# Patient Record
Sex: FEMALE | Race: WHITE
[De-identification: names, ages, dates, MRNs, and addresses within clinical notes are randomized per-mention and may not be internally consistent; named-entity substitution may affect disease eponyms.]

---

## 2019-01-27 ENCOUNTER — HOSPITAL ENCOUNTER (EMERGENCY)
Dept: HOSPITAL 74 - JER | Age: 24
Discharge: HOME | End: 2019-01-27
Payer: COMMERCIAL

## 2019-01-27 VITALS — BODY MASS INDEX: 30.9 KG/M2

## 2019-01-27 VITALS — SYSTOLIC BLOOD PRESSURE: 126 MMHG | HEART RATE: 69 BPM | DIASTOLIC BLOOD PRESSURE: 68 MMHG

## 2019-01-27 VITALS — TEMPERATURE: 98.5 F

## 2019-01-27 DIAGNOSIS — G43.109: Primary | ICD-10-CM

## 2019-01-27 LAB
ALBUMIN SERPL-MCNC: 3.7 G/DL (ref 3.4–5)
ALP SERPL-CCNC: 73 U/L (ref 45–117)
ALT SERPL-CCNC: 16 U/L (ref 13–61)
ANION GAP SERPL CALC-SCNC: 5 MMOL/L (ref 8–16)
APPEARANCE UR: (no result)
AST SERPL-CCNC: 13 U/L (ref 15–37)
BASOPHILS # BLD: 1.2 % (ref 0–2)
BILIRUB SERPL-MCNC: 0.2 MG/DL (ref 0.2–1)
BILIRUB UR STRIP.AUTO-MCNC: NEGATIVE MG/DL
BUN SERPL-MCNC: 14 MG/DL (ref 7–18)
CALCIUM SERPL-MCNC: 8.3 MG/DL (ref 8.5–10.1)
CHLORIDE SERPL-SCNC: 107 MMOL/L (ref 98–107)
CO2 SERPL-SCNC: 28 MMOL/L (ref 21–32)
COLOR UR: YELLOW
CREAT SERPL-MCNC: 0.6 MG/DL (ref 0.55–1.3)
DEPRECATED RDW RBC AUTO: 13.6 % (ref 11.6–15.6)
EOSINOPHIL # BLD: 6.8 % (ref 0–4.5)
EPITH CASTS URNS QL MICRO: (no result) /HPF
GLUCOSE SERPL-MCNC: 95 MG/DL (ref 74–106)
HCT VFR BLD CALC: 36.5 % (ref 32.4–45.2)
HGB BLD-MCNC: 12.7 GM/DL (ref 10.7–15.3)
KETONES UR QL STRIP: NEGATIVE
LEUKOCYTE ESTERASE UR QL STRIP.AUTO: NEGATIVE
LYMPHOCYTES # BLD: 38.6 % (ref 8–40)
MCH RBC QN AUTO: 30.7 PG (ref 25.7–33.7)
MCHC RBC AUTO-ENTMCNC: 34.8 G/DL (ref 32–36)
MCV RBC: 88.4 FL (ref 80–96)
MONOCYTES # BLD AUTO: 12.6 % (ref 3.8–10.2)
MUCOUS THREADS URNS QL MICRO: (no result)
NEUTROPHILS # BLD: 40.8 % (ref 42.8–82.8)
NITRITE UR QL STRIP: NEGATIVE
PH UR: 6 [PH] (ref 5–8)
PLATELET # BLD AUTO: 246 K/MM3 (ref 134–434)
PMV BLD: 8.1 FL (ref 7.5–11.1)
POTASSIUM SERPLBLD-SCNC: 4.4 MMOL/L (ref 3.5–5.1)
PROT SERPL-MCNC: 7.6 G/DL (ref 6.4–8.2)
PROT UR QL STRIP: (no result)
PROT UR QL STRIP: NEGATIVE
RBC # BLD AUTO: 4.13 M/MM3 (ref 3.6–5.2)
SODIUM SERPL-SCNC: 140 MMOL/L (ref 136–145)
SP GR UR: 1.02 (ref 1.01–1.03)
UROBILINOGEN UR STRIP-MCNC: NEGATIVE MG/DL (ref 0.2–1)
WBC # BLD AUTO: 4.5 K/MM3 (ref 4–10)

## 2019-01-27 PROCEDURE — 3E033GC INTRODUCTION OF OTHER THERAPEUTIC SUBSTANCE INTO PERIPHERAL VEIN, PERCUTANEOUS APPROACH: ICD-10-PCS | Performed by: STUDENT IN AN ORGANIZED HEALTH CARE EDUCATION/TRAINING PROGRAM

## 2019-01-27 NOTE — PDOC
History of Present Illness





- General


Chief Complaint: Lightheaded


Stated Complaint: DIZZNESS


Time Seen by Provider: 01/27/19 15:36


History Source: Patient,  Used (#[id number])


Exam Limitations: Clinical Condition





- History of Present Illness


Initial Comments: 





01/27/19 16:13


Patient with history of migraines present with complaint of 4 day history of 

headache, dizziness, nausea, increased light sensitivity and lightheadedness. 

Patient reported she was seen in [facility] 3 days ago for symptoms and 

discharged home on medication which she does not remember the name but has not 

been helping with her symptoms. Patient reports she was diagnosed with migraine 

3 years ago in the [place] and was treated there for stopped taking 

the medication because migraine resolved. Patient denies vomiting, shortness of 

breath, chest pain, fever, chills. Patient denies any other symptoms








Timing/Duration: other (4 days)





Past History





- Past Medical History


Allergies/Adverse Reactions: 


 Allergies











Allergy/AdvReac Type Severity Reaction Status Date / Time


 


No Known Allergies Allergy   Verified 01/27/19 16:05











Home Medications: 


Ambulatory Orders





Meclizine HCl [Antivert -] 25 mg PO QID PRN #28 tablet 01/27/19 


Sumatriptan Succinate [Imitrex] 25 mg PO Q6H PRN #20 tablet 01/27/19 








COPD: No


Dementia: No


HTN: No





- Surgical History


Gastric Stapling: No


Neurologic Surgery: No





- Immunization History


Immunization Up to Date: No





- Suicide/Smoking/Psychosocial Hx


Smoking History: Never smoked


Have you smoked in the past 12 months: No


Information on smoking cessation initiated: No


Hx Alcohol Use: Yes


Drug/Substance Use Hx: No





**Review of Systems





- Review of Systems


Able to Perform ROS?: Yes


Is the patient limited English proficient: No


Constitutional: No: Chills, Fever, Weakness


HEENTM: Yes: Symptoms Reported, See HPI, Eye Pain (photosentivity), Nose 

Congestion.  No: Blurred Vision, Tearing, Recent change in vision, Double Vision

, Cataracts, Ear Pain, Ocular Prothesis, Ear Discharge, Nose Pain, Tinnitus, 

Nose Bleeding, Hearing Loss, Throat Pain, Throat Swelling, Mouth Pain, Dental 

Problems, Difficulty Swallowing, Mouth Swelling, Other


Respiratory: No: Symptoms reported, See HPI, Cough, Orthopnea, Shortness of 

Breath, SOB with Exertion, SOB at Rest, Stridor, Wheezing, Productive cough, 

Hemoptysis, Other


Cardiac (ROS): No: Symptoms Reported, See HPI, Chest Pain, Edema, Irregular 

Heart Rate, Lightheadedness, Palpitations, Syncope, Chest Tightness, Other


ABD/GI: Yes: Nausea, Vomiting.  No: Constipated, Diarrhea, Abdominal cramping


: No: Dysuria, Frequency


Musculoskeletal: No: Muscle Pain


Neurological: Yes: See HPI, Headache, Dizziness.  No: Numbness, Paresthesia, 

Seizure, Weakness, Unsteady Gait, Ataxia


Psychiatric: No: Sleep Pattern Change, Mood Swings, Change in Appetite


All Other Systems: Reviewed and Negative





*Physical Exam





- Vital Signs


 Last Vital Signs











Temp Pulse Resp BP Pulse Ox


 


 98.5 F   73   18   123/76   100 


 


 01/27/19 15:23  01/27/19 15:23  01/27/19 15:23  01/27/19 15:23  01/27/19 15:23














- Physical Exam


Comments: 





01/27/19 16:16


GENERAL:


Well developed, well nourished. Awake and alert. No acute distress.


HEENT:


Normocephalic, atraumatic. PERRLA, EOMI. No conjunctival pallor. Sclera are non-

icteric. Moist mucous membranes. Oropharynx is clear.


NECK: 


Supple. Full ROM. No JVD. Carotid pulses 2+ and symmetric, without bruits. No 

thyromegaly. No lymphadenopathy.


CARDIOVASCULAR:


Regular rate and rhythm. No murmurs, rubs, or gallops. Distal pulses are 2+ and 

symmetric. 


PULMONARY: 


No evidence of respiratory distress. Lungs clear to auscultation bilaterally. 

No wheezing, rales or rhonchi.


ABDOMINAL:


Soft. Non-tender. Non-distended. No rebound or guarding. No organomegaly. 

Normoactive bowel sounds. 


MUSCULOSKELETAL 


Normal range of motion at all joints. 


SKIN: 


Warm and dry. no cyanosis. Normal capillary refill. No rashes. No jaundice. 


NEUROLOGICAL: 


Alert, awake, appropriate. Cranial nerves 2-12 intact. No motor deficits in the 

in face, upper extremities and lower extremities. Normal tendem walking. Normal 

speech. Toes are down-going bilaterally. Gait is normal without ataxia.


PSYCHIATRIC: 


Cooperative. Good eye contact. Appropriate mood and affect.


General Appearance: Yes: Nourished, Appropriately Dressed.  No: Apparent 

Distress





Moderate Sedation





- Procedure Monitoring


Vital Signs: 


Procedure Monitoring Vital Signs











Temperature  98.5 F   01/27/19 15:23


 


Pulse Rate  73   01/27/19 15:23


 


Respiratory Rate  18   01/27/19 15:23


 


Blood Pressure  123/76   01/27/19 15:23


 


O2 Sat by Pulse Oximetry (%)  100   01/27/19 15:23











ED Treatment Course





- LABORATORY


CBC & Chemistry Diagram: 


 01/27/19 16:18





 01/27/19 16:18





Medical Decision Making





- Medical Decision Making





01/27/19 16:19


Patient with history of migraines present with complaint of 4 day history of 

headache, dizziness, nausea, increased light sensitivity and lightheadedness. 

Patient reported she was seen in [facility] ED 3 days ago for symptoms and 

discharged home on medication which she does not remember the name but has not 

been helping with her symptoms. Patient reports she was diagnosed with migraine 

3 years ago in the [facility] [place] and was treated there for stopped taking 

the medication because migraine resolved


Clinical exam unremarkable with normal neuro exam. Lungs clear to auscultation 

bilateral. Normal ophthalmic exam.


CBC chemistry lab ordered. Reglan 10 mg IV ordered for migraine. IV fluid 

normal saline ordered. Pepcid 20 mg IV ordered. Patient symptoms likely 

migraine with aura. Patient be discharged home on Imitrex if normal labs with 

neurology follow-up.


01/27/19 17:30


CBC chemistry and urine labs normal. Patient reported feeling improvement with 

headache and photophobia after given Reglan  and pepcid But reported  still 

have mild dizziness. Meclizine 25 mg by mouth ordered for possible vertigo 


01/27/19 18:16


Patient reported feeling much better after meclizine. Patient is stable for 

discharge on Imitrex as needed for migraine and meclizine for vertigo as needed 

with neurology follow-up.





*DC/Admit/Observation/Transfer


Diagnosis at time of Disposition: 


 Nausea, Vertigo





Migraine headache with aura


Qualifiers:


 Status migrainosus presence: without status migrainosus Intractability: not 

intractable Qualified Code(s): G43.109 - Migraine with aura, not intractable, 

without status migrainosus








- Discharge Dispostion


Disposition: HOME


Condition at time of disposition: Stable


Decision to Admit order: No





- Prescriptions


Prescriptions: 


Meclizine HCl [Antivert -] 25 mg PO QID PRN #28 tablet


 PRN Reason: dizziness


Sumatriptan Succinate [Imitrex] 25 mg PO Q6H PRN #20 tablet


 PRN Reason: migraine





- Referrals


Referrals: 


Stanton Lizarraga MD [Staff Physician] - 


April Saxena MD [Primary Care Provider] - 





- Patient Instructions


Printed Discharge Instructions:  Frovatriptan May Offer Relief From Migraine 

Pain, Migraine -- Adult, DI for Vertigo


Additional Instructions: 


Your labs was normal. Your symptoms likely from migraine. Take medications as 

prescribed for migraine. Follow-up referred neurology.


Print Language: Romansh





- Post Discharge Activity

## 2019-01-27 NOTE — PDOC
*Physical Exam





- Vital Signs


 Last Vital Signs











Temp Pulse Resp BP Pulse Ox


 


 98.5 F   73   18   123/76   100 


 


 01/27/19 15:23  01/27/19 15:23  01/27/19 15:23  01/27/19 15:23  01/27/19 15:23














Medical Decision Making





- Medical Decision Making





01/27/19 16:01


The patient was seen and evaluated in conjunction with TOM Granados under my 

direct supervision, ancillary studies were reviewed.   I agree with the plan as 

outlined by TOM Granados .  . 





*DC/Admit/Observation/Transfer





- Discharge Dispostion


Condition at time of disposition: Stable





- Referrals


Referrals: 


April Saxena MD [Primary Care Provider] - 





- Patient Instructions





- Post Discharge Activity